# Patient Record
Sex: FEMALE | Race: WHITE | Employment: OTHER | URBAN - NONMETROPOLITAN AREA
[De-identification: names, ages, dates, MRNs, and addresses within clinical notes are randomized per-mention and may not be internally consistent; named-entity substitution may affect disease eponyms.]

---

## 2017-01-04 ENCOUNTER — INITIAL CONSULT (OUTPATIENT)
Dept: PULMONOLOGY | Age: 67
End: 2017-01-04

## 2017-01-04 VITALS
BODY MASS INDEX: 26.93 KG/M2 | SYSTOLIC BLOOD PRESSURE: 110 MMHG | OXYGEN SATURATION: 96 % | HEART RATE: 80 BPM | WEIGHT: 167.6 LBS | HEIGHT: 66 IN | DIASTOLIC BLOOD PRESSURE: 82 MMHG

## 2017-01-04 DIAGNOSIS — R06.83 SNORING: Primary | ICD-10-CM

## 2017-01-04 DIAGNOSIS — G47.10 HYPERSOMNIA: ICD-10-CM

## 2017-01-04 DIAGNOSIS — G47.00 INSOMNIA, UNSPECIFIED TYPE: ICD-10-CM

## 2017-01-04 DIAGNOSIS — G47.33 OSA (OBSTRUCTIVE SLEEP APNEA): ICD-10-CM

## 2017-01-04 PROCEDURE — 99204 OFFICE O/P NEW MOD 45 MIN: CPT | Performed by: INTERNAL MEDICINE

## 2017-01-04 RX ORDER — DIPHENHYDRAMINE HCL 12.5MG/5ML
LIQUID (ML) ORAL 4 TIMES DAILY PRN
COMMUNITY

## 2017-01-20 ENCOUNTER — OFFICE VISIT (OUTPATIENT)
Dept: PULMONOLOGY | Age: 67
End: 2017-01-20

## 2017-01-20 VITALS
OXYGEN SATURATION: 99 % | WEIGHT: 167.8 LBS | DIASTOLIC BLOOD PRESSURE: 79 MMHG | HEART RATE: 78 BPM | BODY MASS INDEX: 26.97 KG/M2 | HEIGHT: 66 IN | SYSTOLIC BLOOD PRESSURE: 122 MMHG

## 2017-01-20 DIAGNOSIS — J30.89 PERENNIAL ALLERGIC RHINITIS, UNSPECIFIED ALLERGIC RHINITIS TRIGGER: Primary | ICD-10-CM

## 2017-01-20 DIAGNOSIS — F45.8 BRUXISM: ICD-10-CM

## 2017-01-20 DIAGNOSIS — R06.83 SNORING: ICD-10-CM

## 2017-01-20 PROCEDURE — 3014F SCREEN MAMMO DOC REV: CPT | Performed by: INTERNAL MEDICINE

## 2017-01-20 PROCEDURE — G8400 PT W/DXA NO RESULTS DOC: HCPCS | Performed by: INTERNAL MEDICINE

## 2017-01-20 PROCEDURE — 1090F PRES/ABSN URINE INCON ASSESS: CPT | Performed by: INTERNAL MEDICINE

## 2017-01-20 PROCEDURE — 99213 OFFICE O/P EST LOW 20 MIN: CPT | Performed by: INTERNAL MEDICINE

## 2017-01-20 PROCEDURE — 4040F PNEUMOC VAC/ADMIN/RCVD: CPT | Performed by: INTERNAL MEDICINE

## 2017-01-20 PROCEDURE — 3017F COLORECTAL CA SCREEN DOC REV: CPT | Performed by: INTERNAL MEDICINE

## 2017-01-20 PROCEDURE — G8427 DOCREV CUR MEDS BY ELIG CLIN: HCPCS | Performed by: INTERNAL MEDICINE

## 2017-01-20 PROCEDURE — G8484 FLU IMMUNIZE NO ADMIN: HCPCS | Performed by: INTERNAL MEDICINE

## 2017-01-20 PROCEDURE — G8420 CALC BMI NORM PARAMETERS: HCPCS | Performed by: INTERNAL MEDICINE

## 2017-01-20 PROCEDURE — 1036F TOBACCO NON-USER: CPT | Performed by: INTERNAL MEDICINE

## 2017-01-20 PROCEDURE — 1123F ACP DISCUSS/DSCN MKR DOCD: CPT | Performed by: INTERNAL MEDICINE

## 2017-01-20 RX ORDER — FLUTICASONE PROPIONATE 50 MCG
2 SPRAY, SUSPENSION (ML) NASAL DAILY
Qty: 1 BOTTLE | Refills: 6 | Status: SHIPPED | OUTPATIENT
Start: 2017-01-20 | End: 2019-08-19

## 2017-03-07 DIAGNOSIS — G43.109 MIGRAINE WITH AURA AND WITHOUT STATUS MIGRAINOSUS, NOT INTRACTABLE: ICD-10-CM

## 2017-03-07 RX ORDER — TOPIRAMATE 50 MG/1
TABLET, FILM COATED ORAL
Qty: 30 TABLET | Refills: 3 | Status: SHIPPED | OUTPATIENT
Start: 2017-03-07 | End: 2017-07-12 | Stop reason: SDUPTHER

## 2017-07-12 DIAGNOSIS — G43.109 MIGRAINE WITH AURA AND WITHOUT STATUS MIGRAINOSUS, NOT INTRACTABLE: ICD-10-CM

## 2017-07-12 RX ORDER — TOPIRAMATE 50 MG/1
TABLET, FILM COATED ORAL
Qty: 30 TABLET | Refills: 3 | Status: SHIPPED | OUTPATIENT
Start: 2017-07-12 | End: 2017-08-02

## 2017-08-02 ENCOUNTER — OFFICE VISIT (OUTPATIENT)
Dept: NEUROLOGY | Age: 67
End: 2017-08-02
Payer: MEDICARE

## 2017-08-02 VITALS
DIASTOLIC BLOOD PRESSURE: 82 MMHG | SYSTOLIC BLOOD PRESSURE: 123 MMHG | HEART RATE: 75 BPM | WEIGHT: 161 LBS | HEIGHT: 65 IN | BODY MASS INDEX: 26.82 KG/M2

## 2017-08-02 DIAGNOSIS — G43.109 MIGRAINE WITH AURA AND WITHOUT STATUS MIGRAINOSUS, NOT INTRACTABLE: ICD-10-CM

## 2017-08-02 DIAGNOSIS — R47.1 DYSARTHRIA: Primary | ICD-10-CM

## 2017-08-02 PROCEDURE — 1036F TOBACCO NON-USER: CPT | Performed by: PSYCHIATRY & NEUROLOGY

## 2017-08-02 PROCEDURE — 1123F ACP DISCUSS/DSCN MKR DOCD: CPT | Performed by: PSYCHIATRY & NEUROLOGY

## 2017-08-02 PROCEDURE — G8427 DOCREV CUR MEDS BY ELIG CLIN: HCPCS | Performed by: PSYCHIATRY & NEUROLOGY

## 2017-08-02 PROCEDURE — 4040F PNEUMOC VAC/ADMIN/RCVD: CPT | Performed by: PSYCHIATRY & NEUROLOGY

## 2017-08-02 PROCEDURE — 3017F COLORECTAL CA SCREEN DOC REV: CPT | Performed by: PSYCHIATRY & NEUROLOGY

## 2017-08-02 PROCEDURE — 1090F PRES/ABSN URINE INCON ASSESS: CPT | Performed by: PSYCHIATRY & NEUROLOGY

## 2017-08-02 PROCEDURE — G8419 CALC BMI OUT NRM PARAM NOF/U: HCPCS | Performed by: PSYCHIATRY & NEUROLOGY

## 2017-08-02 PROCEDURE — G8400 PT W/DXA NO RESULTS DOC: HCPCS | Performed by: PSYCHIATRY & NEUROLOGY

## 2017-08-02 PROCEDURE — 3014F SCREEN MAMMO DOC REV: CPT | Performed by: PSYCHIATRY & NEUROLOGY

## 2017-08-02 PROCEDURE — 99213 OFFICE O/P EST LOW 20 MIN: CPT | Performed by: PSYCHIATRY & NEUROLOGY

## 2017-08-02 RX ORDER — TOPIRAMATE 50 MG/1
TABLET, FILM COATED ORAL
Qty: 45 TABLET | Refills: 3 | Status: SHIPPED | OUTPATIENT
Start: 2017-08-02 | End: 2018-04-13 | Stop reason: SDUPTHER

## 2017-11-28 ENCOUNTER — HOSPITAL ENCOUNTER (OUTPATIENT)
Dept: INTERVENTIONAL RADIOLOGY/VASCULAR | Age: 67
Discharge: HOME | End: 2017-11-28
Payer: MEDICARE

## 2017-11-28 DIAGNOSIS — M25.552 PAIN OF LEFT HIP JOINT: ICD-10-CM

## 2017-11-28 PROCEDURE — 20610 DRAIN/INJ JOINT/BURSA W/O US: CPT

## 2017-11-28 PROCEDURE — 77002 NEEDLE LOCALIZATION BY XRAY: CPT

## 2017-11-28 PROCEDURE — 6360000002 HC RX W HCPCS

## 2017-11-28 PROCEDURE — 2500000003 HC RX 250 WO HCPCS

## 2017-11-28 PROCEDURE — 6360000004 HC RX CONTRAST MEDICATION: Performed by: RADIOLOGY

## 2017-11-28 PROCEDURE — A6413 ADHESIVE BANDAGE, FIRST-AID: HCPCS

## 2017-11-28 RX ORDER — BUPIVACAINE HYDROCHLORIDE 2.5 MG/ML
5 INJECTION, SOLUTION EPIDURAL; INFILTRATION; INTRACAUDAL ONCE
Status: COMPLETED | OUTPATIENT
Start: 2017-11-28 | End: 2017-11-28

## 2017-11-28 RX ORDER — METHYLPREDNISOLONE ACETATE 80 MG/ML
80 INJECTION, SUSPENSION INTRA-ARTICULAR; INTRALESIONAL; INTRAMUSCULAR; SOFT TISSUE ONCE
Status: DISCONTINUED | OUTPATIENT
Start: 2017-11-28 | End: 2017-11-28

## 2017-11-28 RX ORDER — TRIAMCINOLONE ACETONIDE 40 MG/ML
40 INJECTION, SUSPENSION INTRA-ARTICULAR; INTRAMUSCULAR ONCE
Status: COMPLETED | OUTPATIENT
Start: 2017-11-28 | End: 2017-11-28

## 2017-11-28 RX ADMIN — IOTHALAMATE MEGLUMINE 1 ML: 600 INJECTION INTRAVASCULAR at 11:54

## 2017-11-28 RX ADMIN — BUPIVACAINE HYDROCHLORIDE 4 ML: 2.5 INJECTION, SOLUTION EPIDURAL; INFILTRATION; INTRACAUDAL at 11:54

## 2017-11-28 RX ADMIN — TRIAMCINOLONE ACETONIDE 40 MG: 40 INJECTION, SUSPENSION INTRA-ARTICULAR; INTRAMUSCULAR at 11:54

## 2017-11-28 ASSESSMENT — PAIN SCALES - GENERAL
PAINLEVEL_OUTOF10: 7
PAINLEVEL_OUTOF10: 7

## 2017-11-28 ASSESSMENT — PAIN DESCRIPTION - ORIENTATION: ORIENTATION: LEFT

## 2017-11-28 ASSESSMENT — PAIN DESCRIPTION - DESCRIPTORS: DESCRIPTORS: ACHING;SHARP

## 2017-11-28 ASSESSMENT — PAIN DESCRIPTION - LOCATION: LOCATION: HIP

## 2017-11-28 ASSESSMENT — PAIN DESCRIPTION - PAIN TYPE: TYPE: CHRONIC PAIN

## 2017-11-28 NOTE — PROGRESS NOTES
1145 Pt in specials radiology for left hip injection. Explained procedure to pt and pt verbalizes understanding. Consent signed. [id number] Dr LYN Rodarte to speak to pt.  1154 Left hip injection complete per Dr Ang November. Pt tolerated well. 1156 Pt discharged ambulatory with steady gait. Offers no complaints.

## 2017-11-28 NOTE — PROGRESS NOTES
Department of Radiology  Post Procedure Progress Note    Pre-Procedure Diagnosis:  Hip pain    Procedure Performed:  Hip joint block/steroid injection      Anesthesia: local     Findings: successful    Immediate Complications:  None    Estimated Blood Loss: minimal    SEE DICTATED PROCEDURE NOTE FOR COMPLETE DETAILS.     Nikole Jean MD   11/28/2017 11:55 AM

## 2017-11-28 NOTE — PROGRESS NOTES
Formulation and discussion of sedation / procedure plans, risks, benefits, side effects and alternatives with patient and/or responsible adult completed.     Electronically signed by Dori William MD on 11/28/2017 at 11:54 AM

## 2018-04-13 RX ORDER — TOPIRAMATE 50 MG/1
TABLET, FILM COATED ORAL
Qty: 45 TABLET | Refills: 3 | Status: SHIPPED | OUTPATIENT
Start: 2018-04-13 | End: 2019-09-23

## 2019-07-18 ENCOUNTER — PROCEDURE VISIT (OUTPATIENT)
Dept: NEUROLOGY | Age: 69
End: 2019-07-18
Payer: MEDICARE

## 2019-07-18 DIAGNOSIS — M54.12 CERVICAL RADICULOPATHY: Primary | ICD-10-CM

## 2019-07-18 DIAGNOSIS — G56.01 RIGHT CARPAL TUNNEL SYNDROME: ICD-10-CM

## 2019-07-18 DIAGNOSIS — M79.601 RIGHT ARM PAIN: ICD-10-CM

## 2019-07-18 DIAGNOSIS — R20.0 RIGHT ARM NUMBNESS: ICD-10-CM

## 2019-07-18 PROCEDURE — 95910 NRV CNDJ TEST 7-8 STUDIES: CPT | Performed by: PSYCHIATRY & NEUROLOGY

## 2019-07-18 PROCEDURE — 95886 MUSC TEST DONE W/N TEST COMP: CPT | Performed by: PSYCHIATRY & NEUROLOGY

## 2019-08-19 ENCOUNTER — OFFICE VISIT (OUTPATIENT)
Dept: NEUROLOGY | Age: 69
End: 2019-08-19
Payer: MEDICARE

## 2019-08-19 ENCOUNTER — NURSE ONLY (OUTPATIENT)
Dept: LAB | Age: 69
End: 2019-08-19

## 2019-08-19 VITALS
SYSTOLIC BLOOD PRESSURE: 118 MMHG | HEART RATE: 66 BPM | DIASTOLIC BLOOD PRESSURE: 62 MMHG | HEIGHT: 65 IN | WEIGHT: 171 LBS | BODY MASS INDEX: 28.49 KG/M2

## 2019-08-19 DIAGNOSIS — G44.89 OTHER HEADACHE SYNDROME: Primary | ICD-10-CM

## 2019-08-19 DIAGNOSIS — G44.89 OTHER HEADACHE SYNDROME: ICD-10-CM

## 2019-08-19 LAB — SEDIMENTATION RATE, ERYTHROCYTE: 7 MM/HR (ref 0–20)

## 2019-08-19 PROCEDURE — 99213 OFFICE O/P EST LOW 20 MIN: CPT | Performed by: NURSE PRACTITIONER

## 2019-08-19 RX ORDER — GABAPENTIN 100 MG/1
100 CAPSULE ORAL 2 TIMES DAILY
Qty: 60 CAPSULE | Refills: 1 | Status: SHIPPED | OUTPATIENT
Start: 2019-08-19 | End: 2019-09-23

## 2019-08-19 NOTE — PROGRESS NOTES
NEUROLOGY OUT PATIENT FOLLOW UP NOTE:  8/19/20198:35 AM    Wellington Boeck is here for follow up for headache. Her headaches are worse. She was last seen 8/2017. She reports the location and pattern of her headache has changed. Location of headache is left frontal area and radiates to her entire left side. She is now having headaches that are waking her up from sleep at night. . She can have blurred vision with headache. No jaw claudication. She comes in today to discuss medication options and the plan of care going forward. ROS:  Respiratory : no cough, no shortness of breath  Cardiac: no chest pain. No palpitations.   Renal : no flank pain, no hematuria    Skin: no rash      Allergies   Allergen Reactions    Iodine Anaphylaxis    Avelox [Moxifloxacin] Hives    Codeine      Hallucinations      Penicillins Hives       Current Outpatient Medications:     topiramate (TOPAMAX) 50 MG tablet, Take 25 mg in am and 50 mg at night., Disp: 45 tablet, Rfl: 3    diphenhydrAMINE (BENADRYL) 12.5 MG/5ML elixir, Take by mouth 4 times daily as needed for Allergies (as needed), Disp: , Rfl:     calcium-vitamin D (OSCAL-500) 500-200 MG-UNIT per tablet, Take 2 tablets by mouth daily (Patient taking differently: Take 2 tablets by mouth daily Indications: 600mg/400mg bid ), Disp: 30 tablet, Rfl: 3    Lutein-Zeaxanthin 45-1.8 MG CAPS, Take 1 capsule by mouth nightly, Disp: , Rfl:     acetaminophen (TYLENOL) 325 MG tablet, Take 650 mg by mouth every 6 hours as needed for Pain, Disp: , Rfl:     Cyanocobalamin (B-12) 1000 MCG SUBL, Place under the tongue daily, Disp: , Rfl:     Multiple Vitamins-Minerals (MULTIVITAMIN ADULT PO), Take by mouth daily, Disp: , Rfl:     vitamin D (CHOLECALCIFEROL) 1000 UNIT TABS tablet, Take 1,000 Units by mouth daily, Disp: , Rfl:     meclizine (ANTIVERT) 25 MG tablet, Take 25 mg by mouth 3 times daily as needed for Dizziness, Disp: , Rfl:     levothyroxine (SYNTHROID) 75 MCG tablet, Take 75

## 2019-08-29 NOTE — PROGRESS NOTES
Narcolepsy. She never had sleep study in the past.    She underwent sleep study on 1/15/17 and found to have no significant sleep apnea. Review of Systems:   General/Constitutional: she gained 8 lbs of weight from the last visit with normal appetite. No fever or chills. HENT: Negative. Eyes: Negative. Upper respiratory tract: No nasal stuffiness or post nasal drip. Lower respiratory tract/ lungs: Occasional cough with no sputum production. No hemoptysis. Cardiovascular: No palpitations or chest pain. Gastrointestinal: No nausea or vomiting. Neurological: No focal neurologiacal weakness. Extremities: No edema. Musculoskeletal: No complaints. Genitourinary: No complaints. Hematological: Negative. Psychiatric/Behavioral: Negative. Skin: No itching. Past Medical History:   Diagnosis Date    Ankylosing spondylitis ([address]     Asthma     Dysphagia     Macular degeneration     both eyes    Migraines     Nausea & vomiting     Osteoarthritis     Other sleep disturbances     [facility] [address]     Thyroid disease     Vertigo        Past Surgical History:   Procedure Laterality Date    CARPAL TUNNEL RELEASE Right     DENTAL SURGERY  08/11/2016    Peridontal    EYE SURGERY  06/2019    HYSTERECTOMY  2000    INCISION AND DRAINAGE OF NECK ABSCESS  11/2014    From surgery infection    JOINT REPLACEMENT      OTHER SURGICAL HISTORY  10/14    [facility] at [address] [place] [address] Right     TOE SURGERY  11/2015       Social History     Tobacco Use    Smoking status: Never Smoker    Smokeless tobacco: Never Used   Substance Use Topics    Alcohol use:  Yes     Alcohol/week: 1.0 standard drinks     Types: 1 Glasses of wine per week     Comment: occasional    Drug use: No       Allergies   Allergen Reactions    Iodine Anaphylaxis    Avelox [Moxifloxacin] Hives    Codeine      Hallucinations      Penicillins Hives       Current Outpatient Medications   Medication Sig Dispense Refill    lansoprazole (PREVACID) 30 MG delayed release capsule       Omega-3 1000 MG CAPS Take by mouth      Aspirin-Acetaminophen-Caffeine (EXCEDRIN PO) Take by mouth      gabapentin (NEURONTIN) 100 MG capsule Take 1 capsule by mouth 2 times daily for 60 days. Intended supply: 90 days 60 capsule 1    diphenhydrAMINE (BENADRYL) 12.5 MG/5ML elixir Take by mouth 4 times daily as needed for Allergies (as needed)      calcium-vitamin D (OSCAL-500) 500-200 MG-UNIT per tablet Take 2 tablets by mouth daily (Patient taking differently: Take 2 tablets by mouth daily Indications: 600mg/400mg bid ) 30 tablet 3    Lutein-Zeaxanthin 45-1.8 MG CAPS Take 1 capsule by mouth nightly      acetaminophen (TYLENOL) 325 MG tablet Take 650 mg by mouth every 6 hours as needed for Pain      Multiple Vitamins-Minerals (MULTIVITAMIN ADULT PO) Take by mouth daily      vitamin D (CHOLECALCIFEROL) 1000 UNIT TABS tablet Take 1,000 Units by mouth daily      meclizine (ANTIVERT) 25 MG tablet Take 25 mg by mouth 3 times daily as needed for Dizziness      levothyroxine (SYNTHROID) 75 MCG tablet Take 75 mcg by mouth Daily.  albuterol (PROVENTIL HFA;VENTOLIN HFA) 108 (90 BASE) MCG/ACT inhaler Inhale 2 puffs into the lungs every 4 hours as needed for Wheezing.  Carboxymethylcellul-Glycerin 0.5-0.9 % SOLN Apply 1 drop to eye 2 times daily. Each eye      topiramate (TOPAMAX) 50 MG tablet Take 25 mg in am and 50 mg at night. (Patient not taking: Reported on 9/13/2019) 45 tablet 3    aspirin 81 MG chewable tablet Take 1 tablet by mouth daily (Patient not taking: Reported on 8/19/2019) 30 tablet 3    Cyanocobalamin (B-12) 1000 MCG SUBL Place under the tongue daily      APAP-Isometheptene-Dichloral (MIDRIN PO) Take 1 capsule by mouth every 4 hours as needed. No current facility-administered medications for this visit.         Family History   Problem Relation Age of Onset    Depression Mother     Heart Disease Mother     Cancer Father pancratic and lung           /72 (Site: Left Upper Arm, Position: Sitting)   Pulse 71   Ht 5' 5\" (1.651 m)   Wt 175 lb 12.8 oz (79.7 kg)   SpO2 96% Comment: on RA  BMI 29.25 kg/m²     BMI:  Body mass index is 29.25 kg/m². Mallampati airway Class: 2  Neck Circumference:  12 Inches  [place] sleepiness score 19:  17  SAQLI 43    Physical Exam :  Nursing note and vitals reviewed. Constitutional: Patient appears moderately built and moderately nourished. No distress. Patient is oriented to person, place, and time. HENT:   Head: Normocephalic and atraumatic. Right Ear: External ear normal.   Left Ear: External ear normal.   Mouth/Throat: Oropharynx is clear and moist.  No oral thrush. Eyes: Conjunctivae are normal. Pupils are equal, round, and reactive to light. No scleral icterus. Neck: Neck supple. No JVD present. No tracheal deviation present. Cardiovascular: Normal rate, regular rhythm, normal heart sounds. No murmur heard. Pulmonary/Chest: Effort normal and breath sounds normal. No stridor. No respiratory distress. No wheezes. No rales. Patient exhibits no tenderness. Abdominal: Soft. Patient exhibits no distension. No tenderness. Musculoskeletal: Normal range of motion. Extremities: Patient exhibits no edema and no tenderness. Lymphadenopathy:  No cervical adenopathy. Neurological: Patient is alert and oriented to person, place, and time. Skin: Skin is warm and dry. Patient is not diaphoretic. Psychiatric: Patient  has a normal mood and affect.  Patient behavior is normal.       Diagnostic Data:    Sleep study done on : 01/15/2017  PATIENT NAME:  Eduardo Chavez                :      1950  MED REC NO:    [id number]                        ROOM:        ACCOUNT NO:    [de-identified]                          ADMISSION DATE: 01/15/2017  PHYSICIAN:     Clifford Ramos M.D.         RESPIRATORY EVENT ANALYSIS:  Revealed the patient had a total of 3 hypopneas;  the 3 are obstructive in nature. The patient did not have any apneas. The  total number of apneas and hypopneas recorded during the study were 3 with an  apnea-hypopnea index of 0.6. The patient's REM sleep apnea-hypopnea index was  zero. OXYGEN SATURATION MONITORING:  Revealed the patient had a maximum oxygen  desaturation to 91% with a mean oxygen saturation of 94.4%. During the entire  sleep study, the patient's oxygen saturation remained more than 88%. IMPRESSION:  1. No clinically significant obstructive sleep apnea. The patient noticed to  have very minimal snoring during the sleep study. 2.  Periodic limb movements with no significant arousals. 3.  Decreased and delayed REM sleep. 4.  Hypersomnia of uncertain etiology. 5.  Sjogren syndrome. 6.  Rheumatoid arthritis. 7.  Ankylosing spondylitis. 8.  Migraine headaches with aura. Diagnostic Data: 1/15/17  AHI 0.6     Jul 18, 2016   PROCEDURE: CT SOFT TISSUE NECK WO CONTRAST   1. No acute process. Mar 25, 2015   PROCEDURE: FL MODIFIED BARIUM SWALLOW W VIDEO   IMPRESSION:  Minimal laryngeal penetration which cleared, with thin liquids only. MRI of brain:              Assessment:  -Hypersomnia ( Excessive daytime sleepiness) may be due to obstructive sleep apnea Vs Narcolepsy.  -Snoring without witnessed apneas,frequent nocturnal awakenings and excessive daytime sleepiness to evaluate for obstructive sleep apnea. -Inadequate sleep hygiene. - Bruxism noted during the sleep study- she is not using any mouth guard. - Dysphagia for solid foods- Need further evaluation. She used to follow with Dr. Peg Fontana  - Hx of Cervical fusion surgery in 2014. - Hx of Hoarseness of voice. She was evaluated by Dr. Abigail Street- ENT surgeon in [facility]. - Sjogren syndrome. - Allergic rhinitis- she quit taking Flonase due to nose bleeds. - Rheumatoid arthritis. - Ankylosing spondylitis. -Bronchial asthma.  She uses Albuterol HFA 2

## 2019-09-04 ENCOUNTER — TELEPHONE (OUTPATIENT)
Dept: NEUROLOGY | Age: 69
End: 2019-09-04

## 2019-09-04 DIAGNOSIS — G44.89 OTHER HEADACHE SYNDROME: ICD-10-CM

## 2019-09-11 ENCOUNTER — HOSPITAL ENCOUNTER (OUTPATIENT)
Dept: MRI IMAGING | Age: 69
Discharge: HOME | End: 2019-09-11
Payer: MEDICARE

## 2019-09-11 DIAGNOSIS — Z00.6 ENCOUNTER FOR EXAMINATION FOR NORMAL COMPARISON AND CONTROL IN CLINICAL RESEARCH PROGRAM: ICD-10-CM

## 2019-09-11 PROCEDURE — 3209999900 MRI COMPARISON OF OUTSIDE FILMS

## 2019-09-13 ENCOUNTER — OFFICE VISIT (OUTPATIENT)
Dept: PULMONOLOGY | Age: 69
End: 2019-09-13
Payer: MEDICARE

## 2019-09-13 VITALS
HEIGHT: 65 IN | HEART RATE: 71 BPM | SYSTOLIC BLOOD PRESSURE: 116 MMHG | BODY MASS INDEX: 29.29 KG/M2 | OXYGEN SATURATION: 96 % | DIASTOLIC BLOOD PRESSURE: 72 MMHG | WEIGHT: 175.8 LBS

## 2019-09-13 DIAGNOSIS — K21.9 GASTROESOPHAGEAL REFLUX DISEASE WITHOUT ESOPHAGITIS: Primary | ICD-10-CM

## 2019-09-13 DIAGNOSIS — R06.83 SNORING: ICD-10-CM

## 2019-09-13 DIAGNOSIS — G47.10 HYPERSOMNIA: ICD-10-CM

## 2019-09-13 PROCEDURE — 99215 OFFICE O/P EST HI 40 MIN: CPT | Performed by: INTERNAL MEDICINE

## 2019-09-13 RX ORDER — LANSOPRAZOLE 30 MG/1
CAPSULE, DELAYED RELEASE ORAL
COMMUNITY
Start: 2019-08-21

## 2019-09-13 RX ORDER — CHLORAL HYDRATE 500 MG
CAPSULE ORAL
COMMUNITY

## 2019-09-13 NOTE — PATIENT INSTRUCTIONS
Recommendations/Plan:  -Will schedule patient for polysomnogram in the sleep lab followed by MSLT test to further evaluate the etiology of her hypersomnia. -I had a discussion with patient regarding avialable treatment options for her sleep disorder breathing including but not limited to CPAP titration in the sleep lab Vs.Dental appliance placement with referral to a local dentist Vs other available surgical options including Uvulopalatopharyngoplasty, maxillomandibular ostomy and tracheostomy as last option. At the end of discussion, she is not decided on her   treatment if she found to have obstructive sleep apnea at this time.  -We will see Rosy Garcia back in 1week after the sleep study to go over the sleep study results and further management options.  -She was educated to practice good sleep hygiene practices. She was provided with a good sleep hygiene hand out.  -Johanny Juarez was advised to make earlier appointment with my clinic if she develops any worsening of sleep symptoms. She verbalizes understanding.  -Johanny Juarez was advised to not to drive any motor vehicles or operate heavy equipment until her sleep symptoms are under good control. Rosy Garcia verbalizes understanding.   -Schedule patient for follow up her GI specialist I.e Dr. An[facility] at [facility] as soon as possible for further evaluation of worsening of GERD and dysphagia.  -She was advised to use over the counter mouth guard for her bruxism after checking with her dentist. She verbalizes understanding.  -The pathophysiology of reflux is discussed. Anti-reflux measures such as raising the head of the bed, avoiding tight clothing or belts, avoiding eating late at night and not lying down shortly after mealtime and achieving weight loss are discussed.  Avoid ASA, NSAID's, caffeine, peppermints, alcohol and tobacco. Patient should alert me if there are persistent symptoms, dysphagia, weight loss or GI bleeding.   -Continue PPI as prescribed.  -She

## 2019-09-23 ENCOUNTER — OFFICE VISIT (OUTPATIENT)
Dept: NEUROLOGY | Age: 69
End: 2019-09-23
Payer: MEDICARE

## 2019-09-23 VITALS
HEIGHT: 65 IN | BODY MASS INDEX: 29.66 KG/M2 | WEIGHT: 178 LBS | HEART RATE: 80 BPM | SYSTOLIC BLOOD PRESSURE: 108 MMHG | DIASTOLIC BLOOD PRESSURE: 70 MMHG

## 2019-09-23 DIAGNOSIS — G44.89 OTHER HEADACHE SYNDROME: Primary | ICD-10-CM

## 2019-09-23 PROCEDURE — 99213 OFFICE O/P EST LOW 20 MIN: CPT | Performed by: NURSE PRACTITIONER

## 2019-09-23 RX ORDER — TOPIRAMATE 50 MG/1
TABLET, FILM COATED ORAL
Qty: 30 TABLET | Refills: 3 | Status: SHIPPED | OUTPATIENT
Start: 2019-09-23